# Patient Record
Sex: MALE | Race: BLACK OR AFRICAN AMERICAN | NOT HISPANIC OR LATINO | Employment: UNEMPLOYED | URBAN - METROPOLITAN AREA
[De-identification: names, ages, dates, MRNs, and addresses within clinical notes are randomized per-mention and may not be internally consistent; named-entity substitution may affect disease eponyms.]

---

## 2021-12-02 PROBLEM — S82.201A FRACTURE OF RIGHT TIBIA: Status: ACTIVE | Noted: 2021-12-02

## 2024-11-02 ENCOUNTER — HOSPITAL ENCOUNTER (EMERGENCY)
Facility: OTHER | Age: 34
Discharge: ANOTHER HOSPITAL | End: 2024-11-02
Payer: MEDICARE

## 2024-11-02 VITALS
OXYGEN SATURATION: 96 % | SYSTOLIC BLOOD PRESSURE: 146 MMHG | HEART RATE: 94 BPM | RESPIRATION RATE: 17 BRPM | TEMPERATURE: 98 F | DIASTOLIC BLOOD PRESSURE: 83 MMHG

## 2024-11-02 DIAGNOSIS — S02.30XA CLOSED BLOW OUT FRACTURE OF ORBIT, INITIAL ENCOUNTER: Primary | ICD-10-CM

## 2024-11-02 DIAGNOSIS — H05.239 RETROBULBAR HEMORRHAGE: ICD-10-CM
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
## 2024-11-02 LAB
ABO + RH BLD: NORMAL
ANION GAP SERPL CALC-SCNC: 13 MMOL/L (ref 8–16)
BASOPHILS # BLD AUTO: 0.06 K/UL (ref 0–0.2)
BASOPHILS NFR BLD: 0.4 % (ref 0–1.9)
BLD GP AB SCN CELLS X3 SERPL QL: NORMAL
BUN SERPL-MCNC: 14 MG/DL (ref 6–20)
CALCIUM SERPL-MCNC: 9.4 MG/DL (ref 8.7–10.5)
CHLORIDE SERPL-SCNC: 101 MMOL/L (ref 95–110)
CO2 SERPL-SCNC: 25 MMOL/L (ref 23–29)
CREAT SERPL-MCNC: 1 MG/DL (ref 0.5–1.4)
DIFFERENTIAL METHOD BLD: ABNORMAL
EOSINOPHIL # BLD AUTO: 0.1 K/UL (ref 0–0.5)
EOSINOPHIL NFR BLD: 0.6 % (ref 0–8)
ERYTHROCYTE [DISTWIDTH] IN BLOOD BY AUTOMATED COUNT: 13.3 % (ref 11.5–14.5)
EST. GFR  (NO RACE VARIABLE): >60 ML/MIN/1.73 M^2
GLUCOSE SERPL-MCNC: 82 MG/DL (ref 70–110)
HCT VFR BLD AUTO: 41.6 % (ref 40–54)
HGB BLD-MCNC: 12.5 G/DL (ref 14–18)
IMM GRANULOCYTES # BLD AUTO: 0.08 K/UL (ref 0–0.04)
IMM GRANULOCYTES NFR BLD AUTO: 0.5 % (ref 0–0.5)
LYMPHOCYTES # BLD AUTO: 1.8 K/UL (ref 1–4.8)
LYMPHOCYTES NFR BLD: 11.7 % (ref 18–48)
MCH RBC QN AUTO: 23.6 PG (ref 27–31)
MCHC RBC AUTO-ENTMCNC: 30 G/DL (ref 32–36)
MCV RBC AUTO: 79 FL (ref 82–98)
MONOCYTES # BLD AUTO: 1.1 K/UL (ref 0.3–1)
MONOCYTES NFR BLD: 7.1 % (ref 4–15)
NEUTROPHILS # BLD AUTO: 12.6 K/UL (ref 1.8–7.7)
NEUTROPHILS NFR BLD: 79.7 % (ref 38–73)
NRBC BLD-RTO: 0 /100 WBC
PLATELET # BLD AUTO: 488 K/UL (ref 150–450)
PMV BLD AUTO: 7.8 FL (ref 9.2–12.9)
POTASSIUM SERPL-SCNC: 5 MMOL/L (ref 3.5–5.1)
RBC # BLD AUTO: 5.29 M/UL (ref 4.6–6.2)
SODIUM SERPL-SCNC: 139 MMOL/L (ref 136–145)
SPECIMEN OUTDATE: NORMAL
WBC # BLD AUTO: 15.78 K/UL (ref 3.9–12.7)

## 2024-11-02 PROCEDURE — 86901 BLOOD TYPING SEROLOGIC RH(D): CPT

## 2024-11-02 PROCEDURE — 96374 THER/PROPH/DIAG INJ IV PUSH: CPT

## 2024-11-02 PROCEDURE — 63600175 PHARM REV CODE 636 W HCPCS

## 2024-11-02 PROCEDURE — 99285 EMERGENCY DEPT VISIT HI MDM: CPT | Mod: 25

## 2024-11-02 PROCEDURE — 80048 BASIC METABOLIC PNL TOTAL CA: CPT

## 2024-11-02 PROCEDURE — 85025 COMPLETE CBC W/AUTO DIFF WBC: CPT

## 2024-11-02 RX ORDER — KETOROLAC TROMETHAMINE 30 MG/ML
30 INJECTION, SOLUTION INTRAMUSCULAR; INTRAVENOUS
Status: COMPLETED | OUTPATIENT
Start: 2024-11-02 | End: 2024-11-02

## 2024-11-02 RX ORDER — LIDOCAINE HYDROCHLORIDE 10 MG/ML
5 INJECTION, SOLUTION INFILTRATION; PERINEURAL
Status: DISCONTINUED | OUTPATIENT
Start: 2024-11-02 | End: 2024-11-03 | Stop reason: HOSPADM

## 2024-11-02 RX ADMIN — KETOROLAC TROMETHAMINE 30 MG: 30 INJECTION, SOLUTION INTRAMUSCULAR; INTRAVENOUS at 09:11
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
## 2024-11-03 NOTE — ED NOTES
Patient report given to [facility] ambulance EMS at bedside for transport/transfer to [place] at this time , patient care turned over to [address] at this time

## 2024-11-03 NOTE — ED NOTES
Number for Report at [facility]: [phone] OR [phone].  GET SCANS ON DISK   TELE-MONITORING FOR TRANSFER.

## 2025-01-25 PROBLEM — I63.89 AC ISCH MULTI VASC TERRITORIES STROKE: Status: ACTIVE | Noted: 2025-01-25

## 2025-01-25 PROBLEM — R03.0 ELEVATED BLOOD PRESSURE READING WITHOUT DIAGNOSIS OF HYPERTENSION: Status: ACTIVE | Noted: 2025-01-25

## 2025-01-25 PROBLEM — I63.9 ACUTE ISCHEMIC STROKE: Status: ACTIVE | Noted: 2025-01-25

## 2025-01-25 PROBLEM — F17.200 SMOKER: Status: ACTIVE | Noted: 2025-01-25

## 2025-01-25 PROBLEM — I63.9 STROKE: Status: ACTIVE | Noted: 2025-01-25

## 2025-01-25 PROBLEM — F19.10: Status: ACTIVE | Noted: 2025-01-25

## 2025-01-25 PROBLEM — R76.8 HEPATITIS C ANTIBODY POSITIVE IN BLOOD: Status: ACTIVE | Noted: 2025-01-25

## 2025-01-28 PROBLEM — E87.1 HYPONATREMIA: Status: ACTIVE | Noted: 2025-01-28

## 2025-01-28 PROBLEM — R20.0 SENSORY LOSS: Status: ACTIVE | Noted: 2025-01-28

## 2025-01-28 PROBLEM — R47.01 APHASIA: Status: ACTIVE | Noted: 2025-01-28

## 2025-01-28 PROBLEM — F19.20 DRUG DEPENDENCE: Status: ACTIVE | Noted: 2025-01-28

## 2025-01-28 PROBLEM — G81.90 HEMIPLEGIA: Status: ACTIVE | Noted: 2025-01-28

## 2025-01-29 PROBLEM — G81.11 SPASTIC HEMIPLEGIA AFFECTING RIGHT DOMINANT SIDE: Status: ACTIVE | Noted: 2025-01-28

## 2025-01-30 PROBLEM — I63.132 EMBOLIC STROKE INVOLVING LEFT CAROTID ARTERY: Status: ACTIVE | Noted: 2025-01-25

## 2025-03-11 ENCOUNTER — TELEPHONE (OUTPATIENT)
Facility: CLINIC | Age: 35
End: 2025-03-11
Payer: MEDICARE

## 2025-03-11 NOTE — TELEPHONE ENCOUNTER
Patient was a no-show for scheduled appt with PA Scheuermann on 3/10.  I spoke with Ms Carranza.  She states that she was in the process on moving and could not bring her son to visit.  Appt with provider scheduled again on 4/14; reminder notice mailed and contact info updated.

## 2025-04-17 ENCOUNTER — TELEPHONE (OUTPATIENT)
Dept: HEPATOLOGY | Facility: CLINIC | Age: 35
End: 2025-04-17
Payer: MEDICARE

## 2025-04-17 ENCOUNTER — TELEPHONE (OUTPATIENT)
Dept: HOME HEALTH SERVICES | Facility: CLINIC | Age: 35
End: 2025-04-17
Payer: MEDICARE

## 2025-04-17 DIAGNOSIS — R47.01 APHASIA: Primary | ICD-10-CM

## 2025-04-17 DIAGNOSIS — T14.90XA BONE INJURY: ICD-10-CM

## 2025-04-17 DIAGNOSIS — I63.132 EMBOLIC STROKE INVOLVING LEFT CAROTID ARTERY: ICD-10-CM

## 2025-04-17 NOTE — TELEPHONE ENCOUNTER
Patient scheduled for a clinic visit with PA Scheuermann on 4/14/25.  He was a no-show.  Attempt made to reach him for rescheduling.  I left a VM and sent a letter asking that he call hepatology back.

## 2025-04-17 NOTE — TELEPHONE ENCOUNTER
Received request for [facility] appointment with NP from [facility].  Referral placed and patient will be scheduled.

## 2025-04-22 ENCOUNTER — TELEPHONE (OUTPATIENT)
Dept: HOME HEALTH SERVICES | Facility: CLINIC | Age: 35
End: 2025-04-22
Payer: MEDICARE

## 2025-04-24 ENCOUNTER — TELEPHONE (OUTPATIENT)
Dept: HOME HEALTH SERVICES | Facility: CLINIC | Age: 35
End: 2025-04-24
Payer: MEDICARE

## 2025-04-25 ENCOUNTER — TELEPHONE (OUTPATIENT)
Dept: HOME HEALTH SERVICES | Facility: CLINIC | Age: 35
End: 2025-04-25
Payer: MEDICARE

## 2025-04-29 ENCOUNTER — EXTERNAL HOME HEALTH (OUTPATIENT)
Dept: HOME HEALTH SERVICES | Facility: HOSPITAL | Age: 35
End: 2025-04-29
Payer: MEDICARE

## 2025-05-08 ENCOUNTER — DOCUMENT SCAN (OUTPATIENT)
Dept: HOME HEALTH SERVICES | Facility: HOSPITAL | Age: 35
End: 2025-05-08
Payer: MEDICARE

## 2025-05-13 ENCOUNTER — DOCUMENT SCAN (OUTPATIENT)
Dept: HOME HEALTH SERVICES | Facility: HOSPITAL | Age: 35
End: 2025-05-13
Payer: MEDICARE

## 2025-05-28 ENCOUNTER — DOCUMENT SCAN (OUTPATIENT)
Dept: HOME HEALTH SERVICES | Facility: HOSPITAL | Age: 35
End: 2025-05-28
Payer: MEDICARE

## 2025-08-16 ENCOUNTER — HOSPITAL ENCOUNTER (EMERGENCY)
Facility: OTHER | Age: 35
Discharge: HOME | End: 2025-08-16
Attending: STUDENT IN AN ORGANIZED HEALTH CARE EDUCATION/TRAINING PROGRAM
Payer: MEDICARE

## 2025-08-16 VITALS
BODY MASS INDEX: 23.04 KG/M2 | DIASTOLIC BLOOD PRESSURE: 93 MMHG | SYSTOLIC BLOOD PRESSURE: 136 MMHG | OXYGEN SATURATION: 95 % | RESPIRATION RATE: 18 BRPM | TEMPERATURE: 98 F | HEIGHT: 63 IN | WEIGHT: 130 LBS | HEART RATE: 56 BPM
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
## 2025-08-16 DIAGNOSIS — M25.521 RIGHT ELBOW PAIN: ICD-10-CM

## 2025-08-16 PROCEDURE — 99284 EMERGENCY DEPT VISIT MOD MDM: CPT | Mod: 25

## 2025-08-16 PROCEDURE — 25000003 PHARM REV CODE 250: Performed by: STUDENT IN AN ORGANIZED HEALTH CARE EDUCATION/TRAINING PROGRAM

## 2025-08-16 RX ORDER — ACETAMINOPHEN 500 MG
1000 TABLET ORAL
Status: COMPLETED | OUTPATIENT
Start: 2025-08-16 | End: 2025-08-16

## 2025-08-16 RX ADMIN — ACETAMINOPHEN 1000 MG: 500 TABLET, FILM COATED ORAL at 05:08
